# Patient Record
Sex: MALE | Race: WHITE | NOT HISPANIC OR LATINO | Employment: FULL TIME | URBAN - METROPOLITAN AREA
[De-identification: names, ages, dates, MRNs, and addresses within clinical notes are randomized per-mention and may not be internally consistent; named-entity substitution may affect disease eponyms.]

---

## 2017-08-28 ENCOUNTER — OFFICE VISIT - RIVER FALLS (OUTPATIENT)
Dept: FAMILY MEDICINE | Facility: CLINIC | Age: 57
End: 2017-08-28

## 2017-08-28 ASSESSMENT — MIFFLIN-ST. JEOR: SCORE: 1790.81

## 2018-08-14 ENCOUNTER — OFFICE VISIT - RIVER FALLS (OUTPATIENT)
Dept: FAMILY MEDICINE | Facility: CLINIC | Age: 58
End: 2018-08-14

## 2018-08-14 ASSESSMENT — MIFFLIN-ST. JEOR: SCORE: 1781.74

## 2018-10-03 ENCOUNTER — OFFICE VISIT - RIVER FALLS (OUTPATIENT)
Dept: FAMILY MEDICINE | Facility: CLINIC | Age: 58
End: 2018-10-03

## 2018-10-03 ASSESSMENT — MIFFLIN-ST. JEOR: SCORE: 1789.91

## 2019-01-09 ENCOUNTER — OFFICE VISIT - RIVER FALLS (OUTPATIENT)
Dept: FAMILY MEDICINE | Facility: CLINIC | Age: 59
End: 2019-01-09

## 2019-08-12 ENCOUNTER — OFFICE VISIT - RIVER FALLS (OUTPATIENT)
Dept: FAMILY MEDICINE | Facility: CLINIC | Age: 59
End: 2019-08-12

## 2019-08-12 LAB
ALBUMIN UR-MCNC: NEGATIVE G/DL
BILIRUB UR QL STRIP: NEGATIVE
GLUCOSE UR STRIP-MCNC: NEGATIVE MG/DL
HGB UR QL STRIP: NEGATIVE
KETONES UR STRIP-MCNC: NEGATIVE MG/DL
LEUKOCYTE ESTERASE UR QL STRIP: NEGATIVE
NITRATE UR QL: NEGATIVE
PH UR STRIP: 7 [PH] (ref 5–8)
SP GR UR STRIP: 1.01 (ref 1–1.03)

## 2019-08-12 ASSESSMENT — MIFFLIN-ST. JEOR: SCORE: 1763.6

## 2019-08-19 ENCOUNTER — OFFICE VISIT - RIVER FALLS (OUTPATIENT)
Dept: FAMILY MEDICINE | Facility: CLINIC | Age: 59
End: 2019-08-19

## 2019-08-19 ENCOUNTER — COMMUNICATION - RIVER FALLS (OUTPATIENT)
Dept: FAMILY MEDICINE | Facility: CLINIC | Age: 59
End: 2019-08-19

## 2019-08-20 ENCOUNTER — COMMUNICATION - RIVER FALLS (OUTPATIENT)
Dept: FAMILY MEDICINE | Facility: CLINIC | Age: 59
End: 2019-08-20

## 2019-08-20 LAB
B BURGDOR IGG+IGM SER QL: <0.9
ERYTHROCYTE [DISTWIDTH] IN BLOOD BY AUTOMATED COUNT: 12.7 % (ref 11–15)
HCT VFR BLD AUTO: 44.3 % (ref 38.5–50)
HGB BLD-MCNC: 14.8 GM/DL (ref 13.2–17.1)
MCH RBC QN AUTO: 30.1 PG (ref 27–33)
MCHC RBC AUTO-ENTMCNC: 33.4 GM/DL (ref 32–36)
MCV RBC AUTO: 90.2 FL (ref 80–100)
PLATELET # BLD AUTO: 163 10*3/UL (ref 140–400)
PMV BLD: 9.7 FL (ref 7.5–12.5)
RBC # BLD AUTO: 4.91 10*6/UL (ref 4.2–5.8)
TSH SERPL DL<=0.005 MIU/L-ACNC: 2.77 MIU/L (ref 0.4–4.5)
WBC # BLD AUTO: 4.4 10*3/UL (ref 3.8–10.8)

## 2019-08-22 ENCOUNTER — COMMUNICATION - RIVER FALLS (OUTPATIENT)
Dept: FAMILY MEDICINE | Facility: CLINIC | Age: 59
End: 2019-08-22

## 2020-08-05 ENCOUNTER — OFFICE VISIT - RIVER FALLS (OUTPATIENT)
Dept: FAMILY MEDICINE | Facility: CLINIC | Age: 60
End: 2020-08-05

## 2020-08-05 ASSESSMENT — MIFFLIN-ST. JEOR: SCORE: 1745.45

## 2020-08-06 LAB
ALBUMIN UR-MCNC: NEGATIVE G/DL
BILIRUB UR QL STRIP: NEGATIVE
GLUCOSE UR STRIP-MCNC: NEGATIVE MG/DL
HGB UR QL STRIP: NEGATIVE
KETONES UR STRIP-MCNC: NEGATIVE MG/DL
LEUKOCYTE ESTERASE UR QL STRIP: NEGATIVE
NITRATE UR QL: NEGATIVE
PH UR STRIP: 6.5 [PH] (ref 5–8)
SP GR UR STRIP: NORMAL (ref 1–1.03)

## 2021-08-02 ENCOUNTER — OFFICE VISIT - RIVER FALLS (OUTPATIENT)
Dept: FAMILY MEDICINE | Facility: CLINIC | Age: 61
End: 2021-08-02

## 2021-08-02 ASSESSMENT — MIFFLIN-ST. JEOR: SCORE: 1772.67

## 2021-08-06 LAB
ALBUMIN UR-MCNC: NEGATIVE G/DL
APPEARANCE UR: CLEAR
BILIRUB UR QL STRIP: NEGATIVE
COLOR UR AUTO: YELLOW
GLUCOSE UR STRIP-MCNC: NEGATIVE MG/DL
HGB UR QL STRIP: NEGATIVE
KETONES UR STRIP-MCNC: NEGATIVE MG/DL
LEUKOCYTE ESTERASE UR QL STRIP: NEGATIVE
NITRATE UR QL: NEGATIVE
PH UR STRIP: 5 [PH]
SP GR UR STRIP: 1.03
UROBILINOGEN UR STRIP-MCNC: NORMAL MG/DL

## 2022-02-12 VITALS
HEART RATE: 52 BPM | SYSTOLIC BLOOD PRESSURE: 114 MMHG | TEMPERATURE: 97.4 F | SYSTOLIC BLOOD PRESSURE: 112 MMHG | DIASTOLIC BLOOD PRESSURE: 68 MMHG | BODY MASS INDEX: 26.37 KG/M2 | WEIGHT: 199 LBS | DIASTOLIC BLOOD PRESSURE: 62 MMHG | HEIGHT: 73 IN | WEIGHT: 199 LBS | HEART RATE: 64 BPM | BODY MASS INDEX: 26.62 KG/M2 | RESPIRATION RATE: 16 BRPM | TEMPERATURE: 97.6 F

## 2022-02-12 VITALS
SYSTOLIC BLOOD PRESSURE: 113 MMHG | TEMPERATURE: 97.4 F | WEIGHT: 206.8 LBS | DIASTOLIC BLOOD PRESSURE: 73 MMHG | BODY MASS INDEX: 27.66 KG/M2 | HEART RATE: 74 BPM

## 2022-02-12 VITALS
HEIGHT: 73 IN | HEART RATE: 60 BPM | BODY MASS INDEX: 26.64 KG/M2 | SYSTOLIC BLOOD PRESSURE: 120 MMHG | RESPIRATION RATE: 16 BRPM | TEMPERATURE: 97.7 F | DIASTOLIC BLOOD PRESSURE: 70 MMHG | WEIGHT: 201 LBS

## 2022-02-12 VITALS
HEIGHT: 73 IN | WEIGHT: 204.8 LBS | DIASTOLIC BLOOD PRESSURE: 68 MMHG | OXYGEN SATURATION: 97 % | HEART RATE: 58 BPM | SYSTOLIC BLOOD PRESSURE: 106 MMHG | BODY MASS INDEX: 27.14 KG/M2

## 2022-02-12 VITALS
TEMPERATURE: 96.8 F | BODY MASS INDEX: 27.17 KG/M2 | RESPIRATION RATE: 16 BRPM | HEART RATE: 60 BPM | SYSTOLIC BLOOD PRESSURE: 116 MMHG | WEIGHT: 205 LBS | DIASTOLIC BLOOD PRESSURE: 72 MMHG | HEIGHT: 73 IN

## 2022-02-12 VITALS
RESPIRATION RATE: 16 BRPM | HEART RATE: 52 BPM | HEIGHT: 73 IN | TEMPERATURE: 97.1 F | BODY MASS INDEX: 26.9 KG/M2 | WEIGHT: 203 LBS

## 2022-02-12 VITALS
SYSTOLIC BLOOD PRESSURE: 108 MMHG | HEIGHT: 73 IN | DIASTOLIC BLOOD PRESSURE: 76 MMHG | HEART RATE: 52 BPM | BODY MASS INDEX: 25.84 KG/M2 | WEIGHT: 195 LBS | RESPIRATION RATE: 16 BRPM | TEMPERATURE: 97.8 F

## 2022-02-16 NOTE — PROGRESS NOTES
Patient:   OLLIE ANDREW            MRN: [id number]            FIN: [id number]               Age:   61 years     Sex:  Male     :  1960   Associated Diagnoses:   Encounter for examination required by Department of Transportation (DOT)   Author:   Blayne Esparza PA-C      Chief Complaint   2021 10:26 AM CDT    Pt here for a DOT Px.        Subjective   Chief complaint 2021 10:26 AM CDT    Pt here for a DOT Px.   DOT exam.     Here for DOT exam  he drives for [employer]  he has sleep apnea and is on CPAP and uses it regularly      Health Status   Allergies:    Allergic Reactions (Selected)  No Known Medication Allergies   Medications:  (Selected)   Prescriptions  Prescribed  CPAP 6 cm water pressure: CPAP 6 cm water pressure, See Instructions, Instructions: Use every night., Supply, # 1 EA, 0 Refill(s), Type: Maintenance, not on any regular medications   Problem list:    All Problems  Moderate obstructive sleep apnea / SNOMED CT 257496891 / Confirmed  Canceled: CT Cardiac Calcium Score      Objective         Vital Signs   2021 10:26 AM CDT Temperature Tympanic 97.7 DegF  LOW    Peripheral Pulse Rate 60 bpm    Pulse Site Radial artery    Respiratory Rate 16 br/min    Systolic Blood Pressure 120 mmHg    Diastolic Blood Pressure 70 mmHg    Mean Arterial Pressure 87 mmHg    BP Site Right arm      Measurements from flowsheet : Measurements   2021 10:26 AM CDT Height Measured - Standard 72.5 in    Weight Measured - Standard 201 lb    BSA 2.16 m2    Body Mass Index 26.88 kg/m2  HI      General:  No acute distress.    Eye:  Pupils are equal, round and reactive to light, Extraocular movements are intact, visual acuity is normal, horizontal field of vision is 90 degrees bilaterally.    HENT:  Tympanic membranes are clear, Normal hearing, No pharyngeal erythema, No sinus tenderness.    Neck:  Supple, Non-tender, No lymphadenopathy.    Respiratory:  Lungs are clear to auscultation.    Cardiovascular:   Normal rate, Regular rhythm, No murmur.    Gastrointestinal:  Soft, Non-tender, Non-distended, Normal bowel sounds, No organomegaly.    Genitourinary:  testicles smooth symmetrical, without nodules, no rashes or lesions, no hernia present.    Musculoskeletal:  Normal range of motion.    Neurologic:  Cranial Nerves II-XII are grossly intact, Normal deep tendon reflexes, Romberg is negative.    Psychiatric:  Appropriate mood & affect.       Results Review   Results review   Urinalysis is normal      Impression and Plan   Assessment and Plan:          Diagnosis: Encounter for examination required by Department of Transportation (DOT) ([id number] Z02.89).    Orders     Orders   Charges:  9939D DOT Exam (Charge) (Order): Quantity: 1, Encounter for examination required by Department of Transportation (DOT).     Normal DOT exam; Approved for 1 year due to sleep apnea.     .

## 2022-02-16 NOTE — NURSING NOTE
Comprehensive Intake Entered On:  8/5/2020 12:27 PM CDT    Performed On:  8/5/2020 12:14 PM CDT by Bernardo Lindsay CMA               Summary   Chief Complaint :   Pt here for a DOT Px.   Weight Measured :   195 lb(Converted to: 195 lb 0 oz, 88.45 kg)    Height Measured :   72.5 in(Converted to: 6 ft 0 in, 184.15 cm)    Body Mass Index :   26.08 kg/m2 (HI)    Body Surface Area :   2.12 m2   Systolic Blood Pressure :   108 mmHg   Diastolic Blood Pressure :   76 mmHg   Mean Arterial Pressure :   87 mmHg   Peripheral Pulse Rate :   52 bpm (LOW)    BP Site :   Right arm   Pulse Site :   Radial artery   Temperature Tympanic :   97.8 DegF(Converted to: 36.6 DegC)  (LOW)    Respiratory Rate :   16 br/min   Bernardo Lindsay CMA - 8/5/2020 12:14 PM CDT   Health Status   Allergies Verified? :   Yes   Medication History Verified? :   Yes   Medical History Verified? :   Yes   Pre-Visit Planning Status :   Not completed   Tobacco Use? :   Never smoker   Bernardo Lindsay CMA - 8/5/2020 12:14 PM CDT   Meds / Allergies   (As Of: 8/5/2020 12:27:06 PM CDT)   Allergies (Active)   No Known Medication Allergies  Estimated Onset Date:   Unspecified ; Created By:   Nery Donaldson; Reaction Status:   Active ; Category:   Drug ; Substance:   No Known Medication Allergies ; Type:   Allergy ; Updated By:   Nery Donaldson; Reviewed Date:   8/5/2020 12:22 PM CDT        Medication List   (As Of: 8/5/2020 12:27:06 PM CDT)   Prescription/Discharge Order    doxycycline  :   doxycycline ; Status:   Processing ; Ordered As Mnemonic:   doxycycline hyclate 100 mg oral tablet ; Ordering Provider:   Anupam Licona MD; Action Display:   Complete ; Catalog Code:   doxycycline ; Order Dt/Tm:   8/5/2020 12:22:02 PM CDT          Miscellaneous Rx Supply  :   Miscellaneous Rx Supply ; Status:   Prescribed ; Ordered As Mnemonic:   CPAP 6 cm water pressure ; Simple Display Line:   See Instructions, Use every night., 1 EA ; Ordering Provider:   Rod STERN,  Errol; Catalog Code:   Miscellaneous Rx Supply ; Order Dt/Tm:   2/2/2015 6:20:36 PM CST            Vision Testing POC   Corrective Lenses :   None   Eye, Left Visual Acuity :   20/20   Eye, Right Visual Acuity :   20/20   Eye, Bilateral Visual Acuity :   20/15   Bernardo Lindsay CMA - 8/5/2020 12:14 PM CDT   ID Risk Screen   Recent Travel History :   No recent travel   Family Member Travel History :   No recent travel   Other Exposure to Infectious Disease :   Unknown   Bernardo Lindsay CMA - 8/5/2020 12:14 PM CDT   Social History   Social History   (As Of: 8/5/2020 12:27:06 PM CDT)   Alcohol:        Current   Comments:  2/2/2015 6:10 PM - Errol Velasco MD: Rarely   (Last Updated: 2/2/2015 6:10:38 PM CST by Errol Velasco MD)          Tobacco:        Never   (Last Updated: 2/2/2015 6:10:38 PM CST by Errol Velasco MD)          Employment/School:        Employed   Comments:  2/2/2015 6:10 PM - Errol Velasco MD:    (Last Updated: 2/2/2015 6:10:38 PM CST by Errol Velasco MD)          Home/Environment:        Marital status:  (Living together).   (Last Updated: 2/2/2015 6:10:38 PM CST by Errol Velasco MD)          Exercise:        Exercise frequency: 3-4 times/week.   (Last Updated: 2/2/2015 6:10:38 PM CST by Errol Velasco MD)

## 2022-02-16 NOTE — NURSING NOTE
Comprehensive Intake Entered On:  1/9/2019 11:19 AM CST    Performed On:  1/9/2019 11:16 AM CST by Nery Donaldson               Summary   Chief Complaint :   Preop. [facility].  1/23/19.  Dr. Scarlett Martin   Weight Measured :   206.8 lb(Converted to: 206 lb 13 oz, 93.80 kg)    Systolic Blood Pressure :   113 mmHg   Diastolic Blood Pressure :   73 mmHg   Mean Arterial Pressure :   86 mmHg   Peripheral Pulse Rate :   74 bpm   BP Method :   Electronic   HR Method :   Electronic   Temperature Tympanic :   97.4 DegF(Converted to: 36.3 DegC)  (LOW)    Nery Donaldson - 1/9/2019 11:16 AM CST   Health Status   Allergies Verified? :   Yes   Medication History Verified? :   Yes   Pre-Visit Planning Status :   Completed   Tobacco Use? :   Never smoker   Nery Donaldson - 1/9/2019 11:16 AM CST   Meds / Allergies   (As Of: 1/9/2019 11:19:24 AM CST)   Allergies (Active)   No Known Medication Allergies  Estimated Onset Date:   Unspecified ; Created By:   Nery Donaldson; Reaction Status:   Active ; Category:   Drug ; Substance:   No Known Medication Allergies ; Type:   Allergy ; Updated By:   Nery Donaldson; Reviewed Date:   1/9/2019 11:18 AM CST        Medication List   (As Of: 1/9/2019 11:19:24 AM CST)   Prescription/Discharge Order    clindamycin topical  :   clindamycin topical ; Status:   Processing ; Ordered As Mnemonic:   clindamycin 1% topical lotion ; Ordering Provider:   Blayne Esparza PA-C; Action Display:   Complete ; Catalog Code:   clindamycin topical ; Order Dt/Tm:   1/9/2019 11:17:56 AM          Miscellaneous Rx Supply  :   Miscellaneous Rx Supply ; Status:   Prescribed ; Ordered As Mnemonic:   CPAP 6 cm water pressure ; Simple Display Line:   See Instructions, Use every night., 1 EA ; Ordering Provider:   Errol Velasco MD; Catalog Code:   Miscellaneous Rx Supply ; Order Dt/Tm:   2/2/2015 6:20:36 PM

## 2022-02-16 NOTE — PROGRESS NOTES
Patient:   OLLIE ANDREW            MRN: [id number]            FIN: [id number]               Age:   60 years     Sex:  Male     :  1960   Associated Diagnoses:   Encounter for examination required by Department of Transportation (DOT)   Author:   Blayne Esparza PA-C      Chief Complaint   2020 12:14 PM CDT    Pt here for a DOT Px.      Subjective   Chief complaint DOT exam.     Here for DOT exam  he drives for [employer]  he has sleep apnea, wears CPAP has good compliance (100% use > 4hrs; average daily usage is 5 hours, 1 minute)      Health Status   Allergies:    Allergic Reactions (Selected)  No Known Medication Allergies   Medications:  (Selected)   Prescriptions  Prescribed  CPAP 6 cm water pressure: CPAP 6 cm water pressure, See Instructions, Instructions: Use every night., Supply, # 1 EA, 0 Refill(s), Type: Maintenance  doxycycline hyclate 100 mg oral tablet: = 1 tab(s) ( 100 mg ), PO, bid, Instructions: fill if not improving over the next 14 days, # 20 tab(s), 0 Refill(s), Type: Maintenance, [place] [facility] [place](s) Oral bid,x10 day(s),Instr:fill if not improving over the next 14..., not on any regular medications   Problem list:    All Problems  Moderate obstructive sleep apnea / SNOMED CT [id number] / Confirmed  Canceled: CT Cardiac Calcium Score      Objective         Vital Signs   2020 12:14 PM CDT Temperature Tympanic 97.8 DegF  LOW    Peripheral Pulse Rate 52 bpm  LOW    Pulse Site Radial artery    Respiratory Rate 16 br/min    Systolic Blood Pressure 108 mmHg    Diastolic Blood Pressure 76 mmHg    Mean Arterial Pressure 87 mmHg    BP Site Right arm      Measurements from flowsheet : Measurements   2020 12:14 PM CDT Height Measured 72.5 in    Weight Measured 195 lb    BSA 2.12 m2    Body Mass Index 26.08 kg/m2  HI      General:  No acute distress.    Eye:  Pupils are equal, round and reactive to light, Extraocular movements are intact, visual acuity is  normal, horizontal field of vision is 90 degrees bilaterally.    HENT:  Tympanic membranes are clear, Normal hearing, No pharyngeal erythema, No sinus tenderness.    Neck:  Supple, Non-tender, No lymphadenopathy.    Respiratory:  Lungs are clear to auscultation.    Cardiovascular:  Normal rate, Regular rhythm, No murmur.    Gastrointestinal:  Soft, Non-tender, Non-distended, Normal bowel sounds, No organomegaly.    Genitourinary:  testicles smooth symmetrical, without nodules, no rashes or lesions, no hernia present.    Musculoskeletal:  Normal range of motion.    Neurologic:  Cranial Nerves II-XII are grossly intact, Normal deep tendon reflexes, Romberg is negative.    Psychiatric:  Appropriate mood & affect.       Results Review   Results review   Urinalysis is normal      Impression and Plan   Assessment and Plan:          Diagnosis: Encounter for examination required by Department of Transportation (DOT) (PPS62-ID Z02.89).    Orders     Orders   Charges:  9939D DOT Exam (Charge) (Order): Quantity: 1, Encounter for examination required by Department of Transportation (DOT).     Normal DOT exam; Approved for 1 year due to sleep apnea.     .

## 2022-02-16 NOTE — PROGRESS NOTES
Patient:   OLLIE ANDREW            MRN: [id number]            FIN: [id number]               Age:   58 years     Sex:  Male     :  1960   Associated Diagnoses:   Complete rotator cuff tear   Author:   Nigel Villa MD      Preoperative Information   Indication for surgery:  Rotator cuff tear.    Accompanied by:  No one.    Source of history:  Self.           Chief Complaint   2019 11:16 AM CST    Preop. [facility].  19.  Dr. Scarlett Martin        Review of Systems   Constitutional:  Negative.    Eye:  Negative.    Ear/Nose/Mouth/Throat:  Negative.    Respiratory:  Negative.    Cardiovascular:  Negative.    Gastrointestinal:  Negative.    Genitourinary:  Negative.    Hematology/Lymphatics:  Negative.    Endocrine:  Negative.    Immunologic:  Negative.    Musculoskeletal:  Negative.    Integumentary:  Negative.    Neurologic:  Negative.       Health Status   Allergies:    Allergic Reactions (Selected)  No Known Medication Allergies   Medications:  (Selected)   Prescriptions  Prescribed  CPAP 6 cm water pressure: CPAP 6 cm water pressure, See Instructions, Instructions: Use every night., Supply, # 1 EA, 0 Refill(s), Type: Maintenance,    Medications          *denotes recorded medication          CPAP 6 cm water pressure: See Instructions, Use every night., 1 EA.     Problem list:    All Problems  Moderate obstructive sleep apnea / SNOMED CT 653019920 / Confirmed  Canceled: CT Cardiac Calcium Score      Histories   Past Medical History:    No active or resolved past medical history items have been selected or recorded.   Family History:    CAD - Coronary artery disease  Brother (Zeke, )     Procedure history:    Cardiac computed tomography for calcium scoring (SNOMED CT 9385646411) on 2014 at 54 Years.  Comments:  2014 9:48 AM CST - Nandini Lezama  Total Agatston calcium score is 50.   Social History:        Alcohol Assessment            Current                      Comments:                      02/02/2015 - Errol Velasco MD                     Rarely      Tobacco Assessment            Never      Employment and Education Assessment            Employed                     Comments:                      02/02/2015 - Errol Velasco MD                           Home and Environment Assessment            Marital status:  (Living together).      Exercise and Physical Activity Assessment            Exercise frequency: 3-4 times/week.     Has no history of anemia.  Has no history of DVT or pulmonary embolism.  Has no personal history of bleeding problems.   Has  no personal or family history of anesthesia reactions.  Patient  does not have active tuberculosis.    S/he  has not taken aspirin or aspirin containing products in the last week.     S/he  has not taken Plavix (Clopidogrel) in the last 2 weeks.    S/he has not taken warfarin in the past week.    S/he has not been on corticosteroids for more than 2 weeks recently.      S/he  is not DNR before, during or after surgery.    Chest pain / SOB walking up 2 flights of steps? no  Pain in neck or jaw?no  CAD MI?  no  Afib? no  Heart Failure?no  Asthma  or Bronchitis? no  Diabetes? no       Insulin/Orals?   Seizure Disorder? no  CKD?no  Thyroid Disease?no  Liver Diseaseno  CVA?no         Physical Examination   Vital Signs   1/9/2019 11:16 AM CST Temperature Tympanic 97.4 DegF  LOW    Peripheral Pulse Rate 74 bpm    HR Method Electronic    Systolic Blood Pressure 113 mmHg    Diastolic Blood Pressure 73 mmHg    Mean Arterial Pressure 86 mmHg    BP Method Electronic      Measurements from flowsheet : Measurements   1/9/2019 11:16 AM CST    Weight Measured - Standard                206.8 lb     General:  Alert and oriented, No acute distress.    Eye:  Pupils are equal, round and reactive to light, Extraocular movements are intact.    HENT:  Normocephalic, Tympanic membranes are clear, Normal hearing, Oral mucosa is  moist.    Neck:  Supple, Non-tender, No carotid bruit, No jugular venous distention, No lymphadenopathy, No thyromegaly.    Respiratory:  Lungs are clear to auscultation, Respirations are non-labored, Breath sounds are equal.    Cardiovascular:  Normal rate, Regular rhythm, No murmur, Good pulses equal in all extremities, No edema.    Gastrointestinal:  Soft, Non-tender, Non-distended, Normal bowel sounds, No organomegaly.    Musculoskeletal:  Normal range of motion, No tenderness, No swelling, No deformity.    Integumentary:  Warm, Dry.    Neurologic:  Alert, Oriented, Normal sensory, Normal motor function, No focal deficits, Cranial Nerves II-XII are grossly intact, Normal deep tendon reflexes.    Psychiatric:  Cooperative, Appropriate mood & affect, Normal judgment.       Health Maintenance      Recommendations     Pending (in the next year)        OverDue           Lipid Disorders Screen (Male) due  12/17/15  and every 1  year(s)        Due            Alcohol Misuse Screen (Male) due  01/09/19  and every 1  year(s)           Depression Screen (Male) due  01/09/19  and every 1  year(s)           Hepatitis C Screen 1577-2743 (Male) due  01/09/19  One-time only           Influenza Vaccine due  01/09/19  and every 1  year(s)           Lung Cancer Screen (Male) due  01/09/19  and every 1  year(s)           Tetanus Vaccine due  01/09/19  and every 10  year(s)        Near Due            Colorectal Cancer Screen (Colonoscopy) (Male) near due  02/09/19  and every 10  year(s)           Colorectal Cancer Screen (Occult Blood) (Male) near due  02/09/19  and every 10  year(s)           Colorectal Cancer Screen (Sigmoidoscopy) (Male) near due  02/09/19  and every 10  year(s)        Due In Future            Body Mass Index Check (Male) not due until  10/03/19  and every 1  year(s)           Aspirin Therapy for Prevention of CVD (Male) not due until  12/17/19  and every 5  year(s)     Satisfied (in the past 1 year)         Satisfied            Body Mass Index Check (Male) on  10/03/18.           Body Mass Index Check (Male) on  08/14/18.           High Blood Pressure Screen (Male) on  01/09/19.           High Blood Pressure Screen (Male) on  10/03/18.           Tobacco Use Screen (Male) on  01/09/19.           Tobacco Use Screen (Male) on  10/03/18.           Tobacco Use Screen (Male) on  08/14/18.        Review / Management         ECG interpretation:  Within normal limits.       Impression and Plan   Diagnosis     Complete rotator cuff tear (URH51-RZ M75.120).     Condition:  ok for surgery asa2.

## 2022-02-16 NOTE — PROGRESS NOTES
Patient:   OLLIE ANDREW            MRN: [id number]            FIN: [id number]               Age:   59 years     Sex:  Male     :  1960   Associated Diagnoses:   Fatigue   Author:   Anupam Licona MD      Visit Information      Date of Service: 2019 09:16 am  Performing Location: [facility]  Encounter#: [id number]      Primary Care Provider (PCP):  Nigel Villa MD    NPI# [id number]      Referring Provider:  Anupam Licona MD    NPI# [id number]      Chief Complaint   2019 9:22 AM CDT    c/o tick bite on left ankle x 3 1/2 weeks ago, weakness        History of Present Illness   Tick bite on left ankle 3-4 weeks ago. Feels more fatigued last few weeks. Feels run down. Left neck gland felt sore.    Returned to work 2 weeks ago after being off for shoulder surgery.      Review of Systems   Constitutional:  No fever.    Respiratory:  No shortness of breath.    Cardiovascular:  No chest pain.    Gastrointestinal:  No vomiting.       Health Status   Allergies:    Allergic Reactions (Selected)  No Known Medication Allergies   Medications:  (Selected)   Prescriptions  Prescribed  CPAP 6 cm water pressure: CPAP 6 cm water pressure, See Instructions, Instructions: Use every night., Supply, # 1 EA, 0 Refill(s), Type: Maintenance   Problem list:    All Problems  Moderate obstructive sleep apnea / SNOMED CT 314902212 / Confirmed  Canceled: CT Cardiac Calcium Score      Histories   Past Medical History:    No active or resolved past medical history items have been selected or recorded.   Family History:    CAD - Coronary artery disease  Brother (Zeke, )     Procedure history:    Cardiac computed tomography for calcium scoring (SNOMED CT 0410197096) on 2014 at 54 Years.  Comments:  2014 9:48 AM STACI - Nandini Lezama  Total Agatston calcium score is 50.     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    Neck:  Supple.    Respiratory:  Lungs are clear to auscultation.    Cardiovascular:  Normal rate, Regular rhythm.    Gastrointestinal:  Soft, Non-tender, Non-distended.    Musculoskeletal:  Normal range of motion, Normal strength.    Neurologic:  No focal deficits.       Impression and Plan   Diagnosis     Fatigue (SLV27-PN R53.83).       Possible Lyme Disease: will check lab (could be falsely negative so still need to consider Lyme). If negative then follow up if symptoms not resolving. Consider treating if symptoms do not improve

## 2022-02-16 NOTE — PROGRESS NOTES
Patient:   OLLIE ANDREW            MRN: [id number]            FIN: [id number]               Age:   58 years     Sex:  Male     :  1960   Associated Diagnoses:   None   Author:   Allen STERN, Nigel      Procedure   EKG procedure   Indication: preop.     Position: supine.     EKG findings   Interpretation: by primary care provider.     Rhythm: sinus.     Axis: normal axis, normal configuration.     Within normal limits.     Intervals: MS normal, QRS normal, QT normal.     Normal EKG.     P waves: normal.     QRS complex: normal, no Q waves present.     ST-T-U complex: normal.     Interpretation: normal EKG, no ST-T wave abnormalities.     Discussed: with patient.        Impression and Plan   Orders

## 2022-02-16 NOTE — NURSING NOTE
Comprehensive Intake Entered On:  8/19/2019 9:27 AM CDT    Performed On:  8/19/2019 9:22 AM CDT by Jennifer Lopez               Summary   Chief Complaint :   c/o tick bite on left ankle x 3 1/2 weeks ago, weakness     Weight Measured :   199.0 lb(Converted to: 199 lb 0 oz, 90.26 kg)    Systolic Blood Pressure :   112 mmHg   Diastolic Blood Pressure :   62 mmHg   Mean Arterial Pressure :   79 mmHg   Peripheral Pulse Rate :   64 bpm   BP Site :   Right arm   Pulse Site :   Radial artery   BP Method :   Manual   HR Method :   Manual   Temperature Tympanic :   97.6 DegF(Converted to: 36.4 DegC)  (LOW)    Jennifer Lopez - 8/19/2019 9:22 AM CDT   Health Status   Allergies Verified? :   Yes   Medication History Verified? :   Yes   Medical History Verified? :   Yes   Pre-Visit Planning Status :   Completed   Tobacco Use? :   Never smoker   Jennifer Lopez - 8/19/2019 9:22 AM CDT   Consents   Consent for Immunization Exchange :   Consent Granted   Consent for Immunizations to Providers :   Consent Granted   Jennifer Lopez - 8/19/2019 9:22 AM CDT   Meds / Allergies   (As Of: 8/19/2019 9:27:28 AM CDT)   Allergies (Active)   No Known Medication Allergies  Estimated Onset Date:   Unspecified ; Created By:   Nery Donaldson; Reaction Status:   Active ; Category:   Drug ; Substance:   No Known Medication Allergies ; Type:   Allergy ; Updated By:   Nery Donaldson; Reviewed Date:   8/19/2019 9:24 AM CDT        Medication List   (As Of: 8/19/2019 9:27:28 AM CDT)   Prescription/Discharge Order    Miscellaneous Rx Supply  :   Miscellaneous Rx Supply ; Status:   Prescribed ; Ordered As Mnemonic:   CPAP 6 cm water pressure ; Simple Display Line:   See Instructions, Use every night., 1 EA ; Ordering Provider:   Errol Velasco MD; Catalog Code:   Miscellaneous Rx Supply ; Order Dt/Tm:   2/2/2015 6:20:36 PM

## 2022-02-16 NOTE — PROGRESS NOTES
Patient:   OLLIE ANDREW            MRN: [id number]            FIN: [id number]               Age:   58 years     Sex:  Male     :  1960   Associated Diagnoses:   Partial tear of right rotator cuff   Author:   Nigel Villa MD      Visit Information      Date of Service: 10/03/2018 10:34 am  Performing Location: [facility]  Encounter#: [id number]      Chief Complaint   10/3/2018 10:34 AM CDT   Here for concern with right roattor cuff injury playing football, says he has injured same shoulder in the past and went to ortho, no previous surgery on shoulder        History of Present Illness   chief complaint and symptoms as noted above confirmed with patient   Happened a week ago  getting better      Review of Systems   Musculoskeletal:  Negative except as documented in history of present illness.    Integumentary:  Negative.    Neurologic:  Negative.       Health Status   Allergies:    Allergic Reactions (Selected)  Severity Not Documented  No known allergies (No reactions were documented)   Medications:  (Selected)   Prescriptions  Prescribed  CPAP 6 cm water pressure: CPAP 6 cm water pressure, See Instructions, Instructions: Use every night., Supply, # 1 EA, 0 Refill(s), Type: Maintenance  clindamycin 1% topical lotion: 1 kane, TOP, hs, # 60 mL, 1 Refill(s), Type: Maintenance, [place] [facility] [place] Topical hs,    Medications          *denotes recorded medication          CPAP 6 cm water pressure: See Instructions, Use every night., 1 EA.          clindamycin 1% topical lotion: 1 kane, TOP, hs, 60 mL, 1 Refill(s).        Physical Examination   Vital Signs   10/3/2018 10:34 AM CDT Peripheral Pulse Rate 58 bpm  LOW    Pulse Site Radial artery    Systolic Blood Pressure 106 mmHg    Diastolic Blood Pressure 68 mmHg    Mean Arterial Pressure 81 mmHg    BP Site Right arm    Oxygen Saturation 97 %      Measurements from flowsheet : Measurements   10/3/2018 10:34 AM CDT Height  Measured - Standard 72.5 in    Weight Measured - Standard 204.8 lb    BSA 2.18 m2    Body Mass Index 27.39 kg/m2  HI      General:  Alert and oriented, No acute distress.    Musculoskeletal:       Upper extremity exam: Shoulder ( Right, Slight decrease rom  RTC pain with stressing Some weakness  cms otherwise intact ).    Neurologic:  Alert, Oriented.       Impression and Plan   Diagnosis     Partial tear of right rotator cuff (IOM60-VO M75.111).     Course:  Improving.    Plan:  consider mri if not better with pt.         Refer to: Physical therapy.    Patient Instructions:       Counseled: Patient, Regarding treatment, Regarding diagnosis, Activity.

## 2022-02-16 NOTE — NURSING NOTE
Urine Dipstick POC Entered On:  8/6/2021 9:10 AM CDT    Performed On:  8/2/2021 9:09 AM CDT by Cynthia Mayer               Urine Dipstick POC   Urine Color Urine Dipstick :   Yellow   Urine Appearance Urine Dipstick :   Clear   Glucose Urine Dipstick :   Negative   Bilirubin Urine Dipstick :   Negative   Ketones Urine Dipstick :   Negative   Specific Gravity Urine Dipstick :   1.030   Blood Urine Dipstick :   Negative   pH Urine Dipstick :   5   Protein Urine Dipstick :   Negative   Urobilinogen Urine Dipstick :   0.2 mg/dl   Nitrite Urine Dipstick :   Negative   Leukocytes Urine Dipstick :   Negative   POC Test Comments :   Performed at [facility]   Cynthia Mayer - 8/6/2021 9:09 AM CDT

## 2022-02-16 NOTE — PROGRESS NOTES
Patient:   OLLIE ANDREW            MRN: [id number]            FIN: [id number]               Age:   57 years     Sex:  Male     :  1960   Associated Diagnoses:   Encounter for examination required by Department of Transportation (DOT)   Author:   Isaias AGUILAR, Blayne ZAVALA      Subjective   Chief complaint 2017 10:17 AM CDT   Pt here for DOT Px DOT exam.     Here for DOT exam  he drives for [employer]  he has sleep apnea and wears CPAP, his has good compliance (100% days usage with 93% of the days used for >4 hours)          Health Status   Allergies:    Allergic Reactions (Selected)  Severity Not Documented  No known allergies (No reactions were documented)   Medications:  (Selected)   Prescriptions  Prescribed  CPAP 6 cm water pressure: CPAP 6 cm water pressure, See Instructions, Instructions: Use every night., Supply, # 1 EA, 0 Refill(s), Type: Maintenance   Problem list:    All Problems  Moderate obstructive sleep apnea / SNOMED CT 160053635 / Confirmed  Canceled: CT Cardiac Calcium Score      Objective         Vital Signs   2017 10:17 AM CDT Temperature Tympanic 96.8 DegF  LOW    Peripheral Pulse Rate 60 bpm    Pulse Site Radial artery    Respiratory Rate 16 br/min    Systolic Blood Pressure 116 mmHg    Diastolic Blood Pressure 72 mmHg    Mean Arterial Pressure 87 mmHg    BP Site Right arm      Measurements from flowsheet : Measurements   2017 10:17 AM CDT Height Measured - Standard 72.5 in    Weight Measured - Standard 205 lb    BSA 2.18 m2    Body Mass Index 27.42 kg/m2      General:  No acute distress.    Eye:  Pupils are equal, round and reactive to light, Extraocular movements are intact, visual acuity is normal, horizontal field of vision is 90 degrees bilaterally.    HENT:  Tympanic membranes are clear, Normal hearing, No pharyngeal erythema, No sinus tenderness.    Neck:  Supple, Non-tender, No lymphadenopathy.    Respiratory:  Lungs are clear to auscultation.    Cardiovascular:   Normal rate, Regular rhythm, No murmur.    Gastrointestinal:  Soft, Non-tender, Non-distended, Normal bowel sounds, No organomegaly.    Genitourinary:  testicles smooth symmetrical, without nodules, no rashes or lesions, no hernia present.    Musculoskeletal:  Normal range of motion.    Neurologic:  Cranial Nerves II-XII are grossly intact, Normal deep tendon reflexes, Romberg is negative.    Psychiatric:  Appropriate mood & affect.       Impression and Plan   Assessment and Plan:          Diagnosis: Encounter for examination required by Department of Transportation (DOT) (SRB13-UM Z02.89).    Orders     Orders   Charges:  9939D DOT Exam (Charge) (Order): Quantity: 1, Encounter for examination required by Department of Transportation (DOT).     Normal DOT exam; Approved for 1 year due to sleep apnea.     .

## 2022-02-16 NOTE — NURSING NOTE
Comprehensive Intake Entered On:  8/2/2021 10:33 AM CDT    Performed On:  8/2/2021 10:26 AM CDT by Bernardo Lindsay CMA               Summary   Chief Complaint :   Pt here for a DOT Px.   Weight Measured :   201 lb(Converted to: 201 lb 0 oz, 91.172 kg)    Height Measured :   72.5 in(Converted to: 6 ft 0 in, 184.15 cm)    Body Mass Index :   26.88 kg/m2 (HI)    Body Surface Area :   2.16 m2   Systolic Blood Pressure :   120 mmHg   Diastolic Blood Pressure :   70 mmHg   Mean Arterial Pressure :   87 mmHg   Peripheral Pulse Rate :   60 bpm   BP Site :   Right arm   Pulse Site :   Radial artery   Temperature Tympanic :   97.7 DegF(Converted to: 36.5 DegC)  (LOW)    Respiratory Rate :   16 br/min   Bernardo Lindsay CMA - 8/2/2021 10:26 AM CDT   Health Status   Allergies Verified? :   Yes   Medication History Verified? :   Yes   Medical History Verified? :   Yes   Pre-Visit Planning Status :   Completed   Tobacco Use? :   Never smoker   Bernardo Lindsay CMA - 8/2/2021 10:26 AM CDT   Meds / Allergies   (As Of: 8/2/2021 10:33:52 AM CDT)   Allergies (Active)   No Known Medication Allergies  Estimated Onset Date:   Unspecified ; Created By:   Nery Donaldson; Reaction Status:   Active ; Category:   Drug ; Substance:   No Known Medication Allergies ; Type:   Allergy ; Updated By:   Nery Donaldson; Reviewed Date:   8/5/2020 12:22 PM CDT        Medication List   (As Of: 8/2/2021 10:33:52 AM CDT)   Prescription/Discharge Order    Miscellaneous Rx Supply  :   Miscellaneous Rx Supply ; Status:   Prescribed ; Ordered As Mnemonic:   CPAP 6 cm water pressure ; Simple Display Line:   See Instructions, Use every night., 1 EA ; Ordering Provider:   Errol Velasco MD; Catalog Code:   Miscellaneous Rx Supply ; Order Dt/Tm:   2/2/2015 6:20:36 PM CST            Vision Testing POC   Corrective Lenses :   None   Eye, Left Visual Acuity :   20/15   Eye, Right Visual Acuity :   20/25   Eye, Bilateral Visual Acuity :   20/15   Bernardo Lindsay CMA  - 8/2/2021 10:26 AM CDT   Social History   Social History   (As Of: 8/2/2021 10:33:52 AM CDT)   Alcohol:        Current   Comments:  2/2/2015 6:10 PM - Errol Velasco MD: Rarely   (Last Updated: 2/2/2015 6:10:38 PM CST by Errol Velasco MD)          Tobacco:        Never   (Last Updated: 2/2/2015 6:10:38 PM CST by Errol Velasco MD)   Never (less than 100 in lifetime)   (Last Updated: 8/2/2021 10:27:14 AM CDT by Bernardo Lindsay CMA)          Electronic Cigarette/Vaping:        Electronic Cigarette Use: Never.   (Last Updated: 8/2/2021 10:27:18 AM CDT by Bernardo Lindsay CMA)          Employment/School:        Employed   Comments:  2/2/2015 6:10 PM - Errol Velasco MD:    (Last Updated: 2/2/2015 6:10:38 PM CST by Errol Velasco MD)          Home/Environment:        Marital status:  (Living together).   (Last Updated: 2/2/2015 6:10:38 PM CST by Errol Velasco MD)          Exercise:        Exercise frequency: 3-4 times/week.   (Last Updated: 2/2/2015 6:10:38 PM CST by Errol Velasco MD)

## 2022-02-16 NOTE — PROGRESS NOTES
Patient:   OLLIE ANDREW            MRN: [id number]            FIN: [id number]               Age:   59 years     Sex:  Male     :  1960   Associated Diagnoses:   Encounter for examination required by Department of Transportation (DOT)   Author:   Isaias AGUILAR, Blayne ZAVALA      Chief Complaint   2019 11:24 AM CDT   Pt here for a DOT Px.      Subjective   Chief complaint 2019 11:24 AM CDT   Pt here for a DOT Px. DOT exam.     Here for DOT exam  he drives for [employer]  he has sleep apnea, wears CPAP has good compliance (97% use > 4hrs)          Health Status   Allergies:    Allergic Reactions (Selected)  No Known Medication Allergies   Medications:  (Selected)   Prescriptions  Prescribed  CPAP 6 cm water pressure: CPAP 6 cm water pressure, See Instructions, Instructions: Use every night., Supply, # 1 EA, 0 Refill(s), Type: Maintenance, not on any regular medications   Problem list:    All Problems  Moderate obstructive sleep apnea / SNOMED CT [id number] / Confirmed  Canceled: CT Cardiac Calcium Score      Objective         Vital Signs   2019 11:24 AM CDT Temperature Tympanic 97.4 DegF  LOW    Peripheral Pulse Rate 52 bpm  LOW    Pulse Site Radial artery    Respiratory Rate 16 br/min    Systolic Blood Pressure 114 mmHg    Diastolic Blood Pressure 68 mmHg    Mean Arterial Pressure 83 mmHg    BP Site Right arm      Measurements from flowsheet : Measurements   2019 11:24 AM CDT Height Measured - Standard 72.5 in    Weight Measured - Standard 199 lb    BSA 2.15 m2    Body Mass Index 26.62 kg/m2  HI      General:  No acute distress.    Eye:  Pupils are equal, round and reactive to light, Extraocular movements are intact, visual acuity is normal, horizontal field of vision is 90 degrees bilaterally.    HENT:  Tympanic membranes are clear, Normal hearing, No pharyngeal erythema, No sinus tenderness.    Neck:  Supple, Non-tender, No lymphadenopathy.    Respiratory:  Lungs are clear to auscultation.     Cardiovascular:  Normal rate, Regular rhythm, No murmur.    Gastrointestinal:  Soft, Non-tender, Non-distended, Normal bowel sounds, No organomegaly.    Genitourinary:  testicles smooth symmetrical, without nodules, no rashes or lesions, no hernia present.    Musculoskeletal:  Normal range of motion.    Neurologic:  Cranial Nerves II-XII are grossly intact, Normal deep tendon reflexes, Romberg is negative.    Psychiatric:  Appropriate mood & affect.       Results Review   Results review   Lab results   8/12/2019 11:17 AM CDT UA pH 7.0    UA Specific Gravity 1.015    UA Glucose NEGATIVE    UA Bilirubin NEGATIVE    UA Ketones NEGATIVE    U Occult Blood NEGATIVE    UA Protein NEGATIVE    UA Nitrite NEGATIVE    UA Leuk Est NEGATIVE      Urinalysis is normal      Impression and Plan   Assessment and Plan:          Diagnosis: Encounter for examination required by Department of Transportation (DOT) (VOV53-PN Z02.89).    Orders     Orders   Charges:  9939D DOT Exam (Charge) (Order): Quantity: 1, Encounter for examination required by Department of Transportation (DOT).     Normal DOT exam; Approved for 1 year due to sleep apnea.     .

## 2022-02-16 NOTE — TELEPHONE ENCOUNTER
---------------------  From: Zhen/Jennifer MORROW (Phone Messages Pool ([id number]_[facility]))   Sent: 8/22/2019 1:47:53 PM CDT  Subject: Lab Results      Phone Message    PCP: SERA    Time of Call: 0520    Phone Number: [phone]    Returned call at: 1305    Note:     Patient called looking for labs results that he had done on 8/19/19.    Called patient and told him that a results letter was sent out on 8/20/19 and I informed him what the letter said. Told patient he should be receiving the letter shortly.       Last office visit and reason: 8/19/19 Fatigue   Dakota Calvo)  Family Medicine  [address] [place] [address]  Phone: [phone]  Fax: [phone]  Follow Up Time: 1-3 Days   Dakota Calvo)  Family Medicine  [address] [place] [address]  Phone: [phone]  Fax: [phone]  Follow Up Time: 1-3 Days    Jesse Brown ()  Internal Medicine  [address] [place] [address]  Phone: [phone]  Fax: [phone]  Follow Up Time: 1-3 Days

## 2022-02-16 NOTE — LETTER
(Inserted Image. Unable to display)   August 20, 2019      OLLIE ANDREW      [address] [place], [place] [id number]        Dear OLLIE,    Thank you for selecting [facility] for your healthcare needs.  Below you will find the results of your recent tests done at our clinic.     Labs are normal. Lyme screen is negative.      Result Name Current Result Reference Range   TSH (mIU/L)  2.77 8/19/2019 0.40 - 4.50   WBC  4.4 8/19/2019 3.8 - 10.8   Hgb (gm/dL)  14.8 8/19/2019 13.2 - 17.1   Platelet  163 8/19/2019 140 - 400   Lyme Ab IgG/IgM WB  <0.90 8/19/2019        Please contact me or my assistant at [phone] if you have any questions about your results.     Sincerely,        Anupam Licona MD        What do your labs mean?  Below is a glossary of commonly ordered labs:  LDL   Bad Cholesterol   HDL   Good Cholesterol  AST/ALT   Liver Function   Cr/Creatinine   Kidney Function  Microalbumin   Kidney Function  BUN   Kidney Function  PSA   Prostate    TSH   Thyroid Hormone  HgbA1c   Diabetes Test   Hgb (Hemoglobin)   Red Blood Cells

## 2022-02-16 NOTE — PROGRESS NOTES
Patient:   OLLIE ANDREW            MRN: [id number]            FIN: [id number]               Age:   58 years     Sex:  Male     :  1960   Associated Diagnoses:   Encounter for examination required by Department of Transportation (DOT); Moderate obstructive sleep apnea; Facial dermatitis   Author:   Blayne Esparza PA-C      Chief Complaint   2018 3:07 PM CDT    Pt here for a DOT Px.        Subjective   Chief complaint 2018 3:07 PM CDT    Pt here for a DOT Px.   DOT exam.     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 sleep apnea, wears CPAP has good compliance (90% use > 4hrs)      Health Status   Allergies:    Allergic Reactions (Selected)  Severity Not Documented  No known allergies (No reactions were documented)   Medications:  (Selected)   Prescriptions  Prescribed  CPAP 6 cm water pressure: CPAP 6 cm water pressure, See Instructions, Instructions: Use every night., Supply, # 1 EA, 0 Refill(s), Type: Maintenance   Problem list:    All Problems  Moderate obstructive sleep apnea / SNOMED CT [id number] / Confirmed  Canceled: CT Cardiac Calcium Score      Objective         Vital Signs   2018 3:07 PM CDT Temperature Tympanic 97.1 DegF  LOW    Peripheral Pulse Rate 52 bpm  LOW    Pulse Site Radial artery    Respiratory Rate 16 br/min      Measurements from flowsheet : Measurements   2018 3:07 PM CDT Height Measured - Standard 72.5 in    Weight Measured - Standard 203 lb    BSA 2.17 m2    Body Mass Index 27.15 kg/m2  HI      General:  No acute distress.    Eye:  Pupils are equal, round and reactive to light, Extraocular movements are intact, visual acuity is normal, horizontal field of vision is 90 degrees bilaterally.    HENT:  Tympanic membranes are clear, Normal hearing, No pharyngeal erythema, No sinus tenderness.    Neck:  Supple, Non-tender, No lymphadenopathy.    Respiratory:  Lungs are clear to auscultation.    Cardiovascular:  Normal rate, Regular rhythm, No murmur.     Gastrointestinal:  Soft, Non-tender, Non-distended, Normal bowel sounds, No organomegaly.    Genitourinary:  testicles smooth symmetrical, without nodules, no rashes or lesions, no hernia present.    Musculoskeletal:  Normal range of motion.    Neurologic:  Cranial Nerves II-XII are grossly intact, Normal deep tendon reflexes, Romberg is negative.    Psychiatric:  Appropriate mood & affect.       Results Review   Results review   Lab results   8/14/2018 3:02 PM CDT UA pH 7.0    UA Specific Gravity < OR = 1.005    UA Glucose NEGATIVE    UA Bilirubin NEGATIVE    UA Ketones NEGATIVE    U Occult Blood NEGATIVE    UA Protein NEGATIVE    UA Nitrite NEGATIVE    UA Leuk Est NEGATIVE         Impression and Plan   Assessment and Plan:          Diagnosis: Encounter for examination required by Department of Transportation (DOT) (HUG64-DD Z02.89), Moderate obstructive sleep apnea (AOG00-QL G47.33).    Orders     Orders   Charges:  9939D DOT Exam (Charge) (Order): Quantity: 1, Encounter for examination required by Department of Transportation (DOT)  Moderate obstructive sleep apnea.     Normal DOT exam; Approved for 1 year due to sleep apnea.     .    Assessment and Plan:          Diagnosis: Facial dermatitis (QSL40-TV L30.9).         Course: he has some acne-like lesions on his cheeks from where his CPAP mask straps come across  advised to try OTC benzoyl peroxide, will also try topical clindamycin qhs to see if that helps.    Orders     Orders   Pharmacy:  clindamycin 1% topical lotion (Prescribe): 1 kane, TOP, hs, # 60 mL, 1 Refill(s), Type: Maintenance, Pharmacy: [facility] [place],  kane Topical hs.     .

## 2022-09-20 ENCOUNTER — OFFICE VISIT (OUTPATIENT)
Dept: FAMILY MEDICINE | Facility: CLINIC | Age: 62
End: 2022-09-20
Payer: COMMERCIAL

## 2022-09-20 VITALS
WEIGHT: 199.5 LBS | DIASTOLIC BLOOD PRESSURE: 68 MMHG | HEART RATE: 52 BPM | OXYGEN SATURATION: 98 % | SYSTOLIC BLOOD PRESSURE: 104 MMHG | BODY MASS INDEX: 26.69 KG/M2

## 2022-09-20 DIAGNOSIS — Z13.1 SCREENING FOR DIABETES MELLITUS: ICD-10-CM

## 2022-09-20 DIAGNOSIS — Z00.00 ROUTINE GENERAL MEDICAL EXAMINATION AT A HEALTH CARE FACILITY: ICD-10-CM

## 2022-09-20 DIAGNOSIS — G47.33 OSA (OBSTRUCTIVE SLEEP APNEA): ICD-10-CM

## 2022-09-20 DIAGNOSIS — Z12.11 SCREEN FOR COLON CANCER: ICD-10-CM

## 2022-09-20 DIAGNOSIS — Z13.220 SCREENING FOR HYPERLIPIDEMIA: ICD-10-CM

## 2022-09-20 DIAGNOSIS — Z11.59 NEED FOR HEPATITIS C SCREENING TEST: ICD-10-CM

## 2022-09-20 DIAGNOSIS — Z12.5 SCREENING FOR PROSTATE CANCER: Primary | ICD-10-CM

## 2022-09-20 DIAGNOSIS — Z11.4 SCREENING FOR HIV (HUMAN IMMUNODEFICIENCY VIRUS): ICD-10-CM

## 2022-09-20 DIAGNOSIS — C61 PROSTATE CANCER (H): ICD-10-CM

## 2022-09-20 LAB
CHOLEST SERPL-MCNC: 163 MG/DL
HBA1C MFR BLD: 5.2 % (ref 0–5.6)
HDLC SERPL-MCNC: 53 MG/DL
LDLC SERPL CALC-MCNC: 102 MG/DL
NONHDLC SERPL-MCNC: 110 MG/DL
PSA SERPL-MCNC: 3.22 NG/ML (ref 0–4.5)
TRIGL SERPL-MCNC: 41 MG/DL

## 2022-09-20 PROCEDURE — 90471 IMMUNIZATION ADMIN: CPT | Performed by: FAMILY MEDICINE

## 2022-09-20 PROCEDURE — 80061 LIPID PANEL: CPT | Performed by: FAMILY MEDICINE

## 2022-09-20 PROCEDURE — G0103 PSA SCREENING: HCPCS | Performed by: FAMILY MEDICINE

## 2022-09-20 PROCEDURE — 99396 PREV VISIT EST AGE 40-64: CPT | Mod: 25 | Performed by: FAMILY MEDICINE

## 2022-09-20 PROCEDURE — 99213 OFFICE O/P EST LOW 20 MIN: CPT | Mod: 25 | Performed by: FAMILY MEDICINE

## 2022-09-20 PROCEDURE — 90715 TDAP VACCINE 7 YRS/> IM: CPT | Performed by: FAMILY MEDICINE

## 2022-09-20 PROCEDURE — 36415 COLL VENOUS BLD VENIPUNCTURE: CPT | Performed by: FAMILY MEDICINE

## 2022-09-20 PROCEDURE — 86803 HEPATITIS C AB TEST: CPT | Performed by: FAMILY MEDICINE

## 2022-09-20 PROCEDURE — 83036 HEMOGLOBIN GLYCOSYLATED A1C: CPT | Performed by: FAMILY MEDICINE

## 2022-09-20 NOTE — PROGRESS NOTES
Assessment & Plan   Problem List Items Addressed This Visit    None     Visit Diagnoses     Screening for prostate cancer    -  Primary    Screen for colon cancer        Relevant Orders    Colonoscopy Screening  Referral    Screening for HIV (human immunodeficiency virus)        Need for hepatitis C screening test        Relevant Orders    Hepatitis C Screen Reflex to HCV RNA Quant and Genotype    Screening for hyperlipidemia        Relevant Orders    Lipid panel reflex to direct LDL Non-fasting    Prostate cancer (H)        Relevant Orders    PSA, screen    Screening for diabetes mellitus        Relevant Orders    Hemoglobin A1c (Completed)    Routine general medical examination at a health care facility        KAVIN (obstructive sleep apnea)          Prostate cancer screening discussed with patient.  Using joint medical decision making patient will proceed with a PSA test.  He did have one about 3 years ago that was 2.4.  His brother was recently diagnosed with prostate cancer.      High risk of hepatitis C based on birthday we will get a hepatitis C test today.  Patient considers themselves low risk for HIV and declines this test.    Due for colon cancer screening we discussed options he would like to do a colonoscopy referral placed.    Patient is nonfasting so we will get a direct LDL for lipid screening and a hemoglobin A1c for diabetes screening    Obstructive sleep apnea suggested patient consider meeting with the sleep specialist to follow-up on his obstructive sleep apnea.  Did review chart and it looks like at his DOT physicals they are able to determine how often patient is compliant with using his CPAP however I am not sure if how effectively the CPAP is working is evaluated.  He could certainly reach out to his DOT provider for follow-up on this also.    Due for tetanus and shingles.  He would like to defer on the shingles for now but would like to get his tetanus updated.               Return  in about 1 year (around 9/20/2023) for Routine preventive.    Destiny Muhammad MD  [facility]    Milka Garza is a 62 year old, presenting for the following health issues:  PSA Recheck      History of Present Illness       Reason for visit:  Psa test    He eats 4 or more servings of fruits and vegetables daily.He consumes 0 sweetened beverage(s) daily.He exercises with enough effort to increase his heart rate 30 to 60 minutes per day.  He exercises with enough effort to increase his heart rate 4 days per week.   He is taking medications regularly.  Patient is here for an annual preventative visit.  His brother was recently diagnosed with prostate cancer and he would like to get a PSA test done.  He reports that he had 1 done at [facility] approximately 5 years ago.  Obtained consents to go into care everywhere and found his baseline PSA done in 2017 of 2.4.    Not currently on any medications    He has obstructive sleep apnea.  Its been years since he saw the sleep provider but reports that his chip is read annually with his DOT exams.  He uses his CPAP religiously and notices that he misses it if he does not use it.  He is able to get DME supplies ordered on a regular basis.          Review of Systems   Negative except as per HPI      Objective    /68 (BP Location: Right arm, Patient Position: Sitting)   Pulse 52   Wt 90.5 kg (199 lb 8 oz)   SpO2 98%   BMI 26.69 kg/m    Body mass index is 26.69 kg/m .  Physical Exam       General: alert and oriented ×3 no acute distress.    HEENT: Normocephalic and atraumatic.   Eyes pupils are equal round and reactive to light extraocular motion is intact. normal conjunctiva    Hearing is grossly normal and there is no otorrhea. Tympanic membranes are pearly grey with a normal light reflex.    Nares are patent there is no rhinorrhea.     Mucous membranes are moist and pink.    Chest: has bilateral rise with no increased work of  breathing. clear to auscultation without wheezes, rhonchi, or rales.    Cardiovascular: normal perfusion and brisk capillary refill. S1S2 with regular rate and rhythm and no murmurs, gallops or rubs.    Musculoskeletal: no gross focal abnormalities and normal gait.    Neuro: no gross focal abnormalities and memory seems intact. CN 2-12 are grossly intact.    Psychiatric: speech is clear and coherent and fluent. Patient dressed appropriately for the weather. Mood is appropriate and affect is full.       rectal declined

## 2022-09-21 LAB — HCV AB SERPL QL IA: NONREACTIVE

## 2022-10-03 ENCOUNTER — HEALTH MAINTENANCE LETTER (OUTPATIENT)
Age: 62
End: 2022-10-03

## 2022-12-09 ENCOUNTER — TRANSFERRED RECORDS (OUTPATIENT)
Dept: HEALTH INFORMATION MANAGEMENT | Facility: CLINIC | Age: 62
End: 2022-12-09

## 2023-08-05 ENCOUNTER — MYC MEDICAL ADVICE (OUTPATIENT)
Dept: FAMILY MEDICINE | Facility: CLINIC | Age: 63
End: 2023-08-05
Payer: COMMERCIAL

## 2023-08-21 ENCOUNTER — PATIENT OUTREACH (OUTPATIENT)
Dept: CARE COORDINATION | Facility: CLINIC | Age: 63
End: 2023-08-21
Payer: COMMERCIAL

## 2023-09-04 ENCOUNTER — PATIENT OUTREACH (OUTPATIENT)
Dept: CARE COORDINATION | Facility: CLINIC | Age: 63
End: 2023-09-04
Payer: COMMERCIAL

## 2023-10-22 ENCOUNTER — HEALTH MAINTENANCE LETTER (OUTPATIENT)
Age: 63
End: 2023-10-22

## 2024-04-02 ENCOUNTER — OFFICE VISIT (OUTPATIENT)
Dept: FAMILY MEDICINE | Facility: CLINIC | Age: 64
End: 2024-04-02
Payer: COMMERCIAL

## 2024-04-02 VITALS
RESPIRATION RATE: 16 BRPM | OXYGEN SATURATION: 94 % | HEART RATE: 56 BPM | WEIGHT: 204.3 LBS | BODY MASS INDEX: 28.6 KG/M2 | DIASTOLIC BLOOD PRESSURE: 60 MMHG | HEIGHT: 71 IN | TEMPERATURE: 97.8 F | SYSTOLIC BLOOD PRESSURE: 110 MMHG

## 2024-04-02 DIAGNOSIS — Z13.1 SCREENING FOR DIABETES MELLITUS: ICD-10-CM

## 2024-04-02 DIAGNOSIS — L98.9 SKIN LESION: ICD-10-CM

## 2024-04-02 DIAGNOSIS — Z12.5 SCREENING FOR PROSTATE CANCER: ICD-10-CM

## 2024-04-02 DIAGNOSIS — N52.8 OTHER MALE ERECTILE DYSFUNCTION: ICD-10-CM

## 2024-04-02 DIAGNOSIS — Z71.89 ACP (ADVANCE CARE PLANNING): ICD-10-CM

## 2024-04-02 DIAGNOSIS — Z00.00 ROUTINE GENERAL MEDICAL EXAMINATION AT A HEALTH CARE FACILITY: Primary | ICD-10-CM

## 2024-04-02 DIAGNOSIS — E78.5 DYSLIPIDEMIA: ICD-10-CM

## 2024-04-02 DIAGNOSIS — R39.15 URINARY URGENCY: ICD-10-CM

## 2024-04-02 PROBLEM — N48.6 PEYRONIE'S DISEASE: Status: ACTIVE | Noted: 2019-10-16

## 2024-04-02 PROBLEM — M19.019 SHOULDER ARTHRITIS: Status: ACTIVE | Noted: 2018-11-06

## 2024-04-02 LAB
ALBUMIN UR-MCNC: NEGATIVE MG/DL
APPEARANCE UR: CLEAR
BILIRUB UR QL STRIP: NEGATIVE
CHOLEST SERPL-MCNC: 189 MG/DL
COLOR UR AUTO: NORMAL
FASTING STATUS PATIENT QL REPORTED: NO
GLUCOSE UR STRIP-MCNC: NEGATIVE MG/DL
HBA1C MFR BLD: 5.2 % (ref 0–5.6)
HDLC SERPL-MCNC: 51 MG/DL
HGB UR QL STRIP: NEGATIVE
KETONES UR STRIP-MCNC: NEGATIVE MG/DL
LDLC SERPL CALC-MCNC: 127 MG/DL
LEUKOCYTE ESTERASE UR QL STRIP: NEGATIVE
NITRATE UR QL: NEGATIVE
NONHDLC SERPL-MCNC: 138 MG/DL
PH UR STRIP: 7 [PH] (ref 5–7)
PSA SERPL DL<=0.01 NG/ML-MCNC: 4.14 NG/ML (ref 0–4.5)
SP GR UR STRIP: 1.01 (ref 1–1.03)
TRIGL SERPL-MCNC: 55 MG/DL
UROBILINOGEN UR STRIP-ACNC: 0.2 E.U./DL

## 2024-04-02 PROCEDURE — G0103 PSA SCREENING: HCPCS | Performed by: FAMILY MEDICINE

## 2024-04-02 PROCEDURE — 81003 URINALYSIS AUTO W/O SCOPE: CPT | Mod: QW | Performed by: FAMILY MEDICINE

## 2024-04-02 PROCEDURE — 99213 OFFICE O/P EST LOW 20 MIN: CPT | Mod: 25 | Performed by: FAMILY MEDICINE

## 2024-04-02 PROCEDURE — 83036 HEMOGLOBIN GLYCOSYLATED A1C: CPT | Performed by: FAMILY MEDICINE

## 2024-04-02 PROCEDURE — 99396 PREV VISIT EST AGE 40-64: CPT | Performed by: FAMILY MEDICINE

## 2024-04-02 PROCEDURE — 80061 LIPID PANEL: CPT | Performed by: FAMILY MEDICINE

## 2024-04-02 PROCEDURE — 36415 COLL VENOUS BLD VENIPUNCTURE: CPT | Performed by: FAMILY MEDICINE

## 2024-04-02 RX ORDER — SILDENAFIL CITRATE 20 MG/1
40 TABLET ORAL DAILY
Qty: 30 TABLET | Refills: 5 | Status: SHIPPED | OUTPATIENT
Start: 2024-04-02

## 2024-04-02 SDOH — HEALTH STABILITY: PHYSICAL HEALTH: ON AVERAGE, HOW MANY MINUTES DO YOU ENGAGE IN EXERCISE AT THIS LEVEL?: 60 MIN

## 2024-04-02 SDOH — HEALTH STABILITY: PHYSICAL HEALTH: ON AVERAGE, HOW MANY DAYS PER WEEK DO YOU ENGAGE IN MODERATE TO STRENUOUS EXERCISE (LIKE A BRISK WALK)?: 5 DAYS

## 2024-04-02 ASSESSMENT — SOCIAL DETERMINANTS OF HEALTH (SDOH): HOW OFTEN DO YOU GET TOGETHER WITH FRIENDS OR RELATIVES?: PATIENT DECLINED

## 2024-04-02 NOTE — PATIENT INSTRUCTIONS
[facility] [facility]    Preventive Care Advice   This is general advice given by our system to help you stay healthy. However, your care team may have specific advice just for you. Please talk to your care team about your preventive care needs.  Nutrition  Eat 5 or more servings of fruits and vegetables each day.  Try wheat bread, brown rice and whole grain pasta (instead of white bread, rice, and pasta).  Get enough calcium and vitamin D. Check the label on foods and aim for 100% of the RDA (recommended daily allowance).  Lifestyle  Exercise at least 150 minutes each week   (30 minutes a day, 5 days a week).  Do muscle strengthening activities 2 days a week. These help control your weight and prevent disease.  No smoking.  Wear sunscreen to prevent skin cancer.  Have a dental exam and cleaning every 6 months.  Yearly exams  See your health care team every year to talk about:  Any changes in your health.  Any medicines your care team has prescribed.  Preventive care, family planning, and ways to prevent chronic diseases.  Shots (vaccines)   HPV shots (up to age 26), if you've never had them before.  Hepatitis B shots (up to age 59), if you've never had them before.  COVID-19 shot: Get this shot when it's due.  Flu shot: Get a flu shot every year.  Tetanus shot: Get a tetanus shot every 10 years.  Pneumococcal, hepatitis A, and RSV shots: Ask your care team if you need these based on your risk.  Shingles shot (for age 50 and up).  General health tests  Diabetes screening:  Starting at age 35, Get screened for diabetes at least every 3 years.  If you are younger than age 35, ask your care team if you should be screened for diabetes.  Cholesterol test: At age 39, start having a cholesterol test every 5 years, or more often if advised.  Bone density scan (DEXA): At age 50, ask your care team if you should have this scan for osteoporosis (brittle bones).  Hepatitis C: Get tested at least once in your life.  STIs (sexually  transmitted infections)  Before age 24: Ask your care team if you should be screened for STIs.  After age 24: Get screened for STIs if you're at risk. You are at risk for STIs (including HIV) if:  You are sexually active with more than one person.  You don't use condoms every time.  You or a partner was diagnosed with a sexually transmitted infection.  If you are at risk for HIV, ask about PrEP medicine to prevent HIV.  Get tested for HIV at least once in your life, whether you are at risk for HIV or not.  Cancer screening tests  Cervical cancer screening: If you have a cervix, begin getting regular cervical cancer screening tests at age 21. Most people who have regular screenings with normal results can stop after age 65. Talk about this with your provider.  Breast cancer scan (mammogram): If you've ever had breasts, begin having regular mammograms starting at age 40. This is a scan to check for breast cancer.  Colon cancer screening: It is important to start screening for colon cancer at age 45.  Have a colonoscopy test every 10 years (or more often if you're at risk) Or, ask your provider about stool tests like a FIT test every year or Cologuard test every 3 years.  To learn more about your testing options, visit: https://www.[employer]/658606.pdf.  For help making a decision, visit: https://bit.ly/bi48668.  Prostate cancer screening test: If you have a prostate and are age 55 to 69, ask your provider if you would benefit from a yearly prostate cancer screening test.  Lung cancer screening: If you are a current or former smoker age 50 to 80, ask your care team if ongoing lung cancer screenings are right for you.  For informational purposes only. Not to replace the advice of your health care provider. Copyright   2023 [employer]. All rights reserved. Clinically reviewed by the [facility]. [employer] 712347 - REV 01/24.

## 2024-04-02 NOTE — PROGRESS NOTES
Preventive Care Visit  [facility]  Destiny Muhammad MD, Family Medicine  Apr 2, 2024      Assessment & Plan   Problem List Items Addressed This Visit    None  Visit Diagnoses       Routine general medical examination at a health care facility    -  Primary    Screening for diabetes mellitus        Relevant Orders    Hemoglobin A1c (Completed)    Other male erectile dysfunction        Relevant Medications    sildenafil (REVATIO) 20 MG tablet    Screening for prostate cancer        Relevant Orders    PSA, screen    Urinary urgency        Relevant Orders    UA Macroscopic with reflex to Microscopic and Culture - Clinic Collect    Dyslipidemia        Relevant Orders    Lipid panel reflex to direct LDL Non-fasting    Skin lesion        ACP (advance care planning)             Nonfasting labs    Skin lesion right lower anterior leg same color surrounding skin, raised, approximately 3 x 4 mm oblong.  Patient reports that it has been itching.  Suggested patient come to clinic care for us to do a shave biopsy versus follow-up with his dermatologist.  Concern for possible basal cell carcinoma.    Dyslipidemia we are checking nonfasting labs today    Urinary urgency for about a week we will check a UA reflex to culture.  If this is normal suggest patient follow-up with his neurologist  Screening for prostate cancer to be PSA today    Peyronie's disease erectile dysfunction care everywhere reviewed.  Patient was evaluated by urology and has decided to not undergo any further treatment for his Peyronie's disease.  He is hoping that I can renew his sildenafil.  Usually 40 mg is effective for him.  This was renewed today.    Screening for diabetes we will get a hemoglobin A1c today.    Patient declines shingles vaccine today.  Advance care planning discussed today.                             BMI  Estimated body mass index is 28.49 kg/m  as calculated from the following:    Height as of this encounter:  "1.803 m (5' 11\").    Weight as of this encounter: 92.7 kg (204 lb 4.8 oz).   Weight management plan: Discussed healthy diet and exercise guidelines    Counseling  Appropriate preventive services were discussed with this patient, including applicable screening as appropriate for fall prevention, nutrition, physical activity, Tobacco-use cessation, weight loss and cognition.  Checklist reviewing preventive services available has been given to the patient.  Reviewed patient's diet, addressing concerns and/or questions.           Milka Garza is a 64 year old, presenting for the following:  Physical        4/2/2024    11:11 AM   Additional Questions   Roomed by [facility] Directive  Patient does not have a Health Care Directive or Living Will: Discussed advance care planning with patient; information given to patient to review.    HPI  Pt here for wellness visit.              4/2/2024   General Health   How would you rate your overall physical health? Good   Feel stress (tense, anxious, or unable to sleep) Not at all         4/2/2024   Nutrition   Three or more servings of calcium each day? Yes   Diet: I don't know   How many servings of fruit and vegetables per day? (!) 2-3   How many sweetened beverages each day? 0-1         4/2/2024   Exercise   Days per week of moderate/strenous exercise 5 days   Average minutes spent exercising at this level 60 min         4/2/2024   Social Factors   Frequency of gathering with friends or relatives Patient declined   Worry food won't last until get money to buy more No   Food not last or not have enough money for food? No   Do you have housing?  Yes   Are you worried about losing your housing? No   Lack of transportation? No   Unable to get utilities (heat,electricity)? No          No data to display                   4/2/2024   Dental   Dentist two times every year? Yes         4/2/2024   TB Screening   Were you born outside of the [place]? No         Today's " "PHQ-2 Score:       4/2/2024    11:10 AM   PHQ-2 ( 1999 [employer])   Q1: Little interest or pleasure in doing things 0   Q2: Feeling down, depressed or hopeless 0   PHQ-2 Score 0   Q1: Little interest or pleasure in doing things Not at all   Q2: Feeling down, depressed or hopeless Not at all   PHQ-2 Score 0           4/2/2024   Substance Use   Alcohol more than 3/day or more than 7/wk No   Do you use any other substances recreationally? No     Social History     Tobacco Use    Smoking status: Never     Passive exposure: Never    Smokeless tobacco: Never   Vaping Use    Vaping Use: Never used           4/2/2024   STI Screening   New sexual partner(s) since last STI/HIV test? No   Last PSA:   Prostate Specific Antigen Screen   Date Value Ref Range Status   09/20/2022 3.22 0.00 - 4.50 ng/mL Final     ASCVD Risk   The 10-year ASCVD risk score (Rory CABRERA, et al., 2019) is: 6.4%    Values used to calculate the score:      Age: 64 years      Sex: Male      Is Non- : No      Diabetic: No      Tobacco smoker: No      Systolic Blood Pressure: 110 mmHg      Is BP treated: No      HDL Cholesterol: 53 mg/dL      Total Cholesterol: 163 mg/dL           Reviewed and updated as needed this visit by Provider                             Objective    Exam  /60 (BP Location: Right arm, Patient Position: Sitting)   Pulse 56   Temp 97.8  F (36.6  C) (Tympanic)   Resp 16   Ht 1.803 m (5' 11\")   Wt 92.7 kg (204 lb 4.8 oz)   SpO2 94%   BMI 28.49 kg/m     Estimated body mass index is 28.49 kg/m  as calculated from the following:    Height as of this encounter: 1.803 m (5' 11\").    Weight as of this encounter: 92.7 kg (204 lb 4.8 oz).    Physical Exam        General: alert and oriented ×3 no acute distress.    HEENT: pupils are equal round and reactive to light extraocular motion is intact. Normocephalic and atraumatic.     Hearing is grossly normal and there is no otorrhea.     Chest: has bilateral " rise with no increased work of breathing.    Cardiovascular: normal perfusion and brisk capillary refill.    Musculoskeletal: no gross focal abnormalities and normal gait.    Neuro: no gross focal abnormalities and memory seems intact.    Psychiatric: speech is clear and coherent and fluent. Patient dressed appropriately for the weather. Mood is appropriate and affect is full.    right lower anterior leg same color surrounding skin, raised, approximately 3 x 4 mm oblong.              Signed Electronically by: Destiny Muhammad MD

## 2024-04-04 ENCOUNTER — TELEPHONE (OUTPATIENT)
Dept: FAMILY MEDICINE | Facility: CLINIC | Age: 64
End: 2024-04-04
Payer: COMMERCIAL

## 2024-04-04 DIAGNOSIS — R39.15 URINARY URGENCY: Primary | ICD-10-CM

## 2024-04-04 DIAGNOSIS — N48.6 PEYRONIE'S DISEASE: ICD-10-CM

## 2024-04-04 NOTE — TELEPHONE ENCOUNTER
General Call    Contacts         Type Contact Phone/Fax    04/04/2024 12:38 PM CDT Phone (Incoming) Greg Christian (Self) [phone] (H)          Reason for Call:  Patient would like a recommendation for a urologist    Could we send this information to you in [facility] or would you prefer to receive a phone call?:   No preference   Okay to leave a detailed message?: Yes at Cell number on file:    Telephone Information:   Mobile [phone]

## 2024-05-24 ENCOUNTER — OFFICE VISIT (OUTPATIENT)
Dept: FAMILY MEDICINE | Facility: CLINIC | Age: 64
End: 2024-05-24
Payer: COMMERCIAL

## 2024-05-24 VITALS
OXYGEN SATURATION: 97 % | BODY MASS INDEX: 27.59 KG/M2 | SYSTOLIC BLOOD PRESSURE: 112 MMHG | RESPIRATION RATE: 18 BRPM | HEIGHT: 71 IN | TEMPERATURE: 97.8 F | HEART RATE: 53 BPM | DIASTOLIC BLOOD PRESSURE: 68 MMHG | WEIGHT: 197.1 LBS

## 2024-05-24 DIAGNOSIS — J36 TONSIL, ABSCESS: ICD-10-CM

## 2024-05-24 DIAGNOSIS — J02.9 SORE THROAT: Primary | ICD-10-CM

## 2024-05-24 LAB
DEPRECATED S PYO AG THROAT QL EIA: NEGATIVE
GROUP A STREP BY PCR: NOT DETECTED

## 2024-05-24 PROCEDURE — 99213 OFFICE O/P EST LOW 20 MIN: CPT

## 2024-05-24 PROCEDURE — 87651 STREP A DNA AMP PROBE: CPT

## 2024-05-24 ASSESSMENT — ENCOUNTER SYMPTOMS: SORE THROAT: 1

## 2024-05-24 NOTE — PROGRESS NOTES
"  Assessment & Plan     Sore throat  Patient with sore throat and mild throat swelling on the right side since Monday.  Patient feels symptoms are worsening he did have some chills approximately 2 days ago.  - Streptococcus A Rapid Screen w/Reflex to PCR - Clinic Collect  - Group A Streptococcus PCR Throat Swab    Tonsil, abscess  1 to 2 mm area of purulence on right tonsil.  Swelling to right side of neck and tenderness to palpation of area.  Unclear if this is developing abscess or just exudate.  With long holiday weekend and danger of tonsillar abscess will start on Augmentin.  Discussed red flags with patient and when to seek immediate medical care including shortness of breath, difficulty breathing, inability to handle secretions, difficulty swallowing.  Patient verbalizes understanding.  - amoxicillin-clavulanate (AUGMENTIN) 875-125 MG tablet; Take 1 tablet by mouth 2 times daily for 7 days                Subjective   Greg is a 64 year old, presenting for the following health issues:  Pharyngitis (Swollen gland right side, since Monday  )      5/24/2024    11:14 AM   Additional Questions   Roomed by BRITTANY Monge     History of Present Illness       Reason for visit:  Sore throat  Symptom onset:  3-7 days ago  Symptom intensity:  Moderate  Symptom progression:  Staying the same  Had these symptoms before:  No    He eats 2-3 servings of fruits and vegetables daily.He consumes 0 sweetened beverage(s) daily.He exercises with enough effort to increase his heart rate 60 or more minutes per day.    He is taking medications regularly.                     Objective    /68 (BP Location: Right arm, Patient Position: Sitting, Cuff Size: Adult Large)   Pulse 53   Temp 97.8  F (36.6  C) (Tympanic)   Resp 18   Ht 1.803 m (5' 11\")   Wt 89.4 kg (197 lb 1.6 oz)   SpO2 97%   BMI 27.49 kg/m    Body mass index is 27.49 kg/m .  Physical Exam  HENT:      Head: Normocephalic.      Mouth/Throat:      Mouth: Mucous " membranes are moist.      Pharynx: Uvula midline. Posterior oropharyngeal erythema present. No pharyngeal swelling or uvula swelling.      Tonsils: Tonsillar abscess present. 2+ on the right. 1+ on the left.     Eyes:      Extraocular Movements: Extraocular movements intact.      Conjunctiva/sclera: Conjunctivae normal.   Cardiovascular:      Rate and Rhythm: Normal rate.   Pulmonary:      Effort: Pulmonary effort is normal.   Skin:     General: Skin is warm and dry.      Capillary Refill: Capillary refill takes less than 2 seconds.   Neurological:      Mental Status: He is alert and oriented to person, place, and time.                    Signed Electronically by: OVIDIO Mack CNP

## 2024-11-14 ENCOUNTER — TRANSFERRED RECORDS (OUTPATIENT)
Dept: HEALTH INFORMATION MANAGEMENT | Facility: CLINIC | Age: 64
End: 2024-11-14
Payer: COMMERCIAL

## 2025-04-03 ENCOUNTER — LAB (OUTPATIENT)
Dept: LAB | Facility: CLINIC | Age: 65
End: 2025-04-03

## 2025-04-03 DIAGNOSIS — M25.521 RIGHT ELBOW PAIN: ICD-10-CM

## 2025-04-03 DIAGNOSIS — R53.83 FATIGUE: ICD-10-CM

## 2025-04-03 DIAGNOSIS — N52.9 IMPOTENCE OF ORGANIC ORIGIN: ICD-10-CM

## 2025-04-03 DIAGNOSIS — K58.2 IRRITABLE BOWEL SYNDROME WITH CONSTIPATION AND DIARRHEA: ICD-10-CM

## 2025-04-03 DIAGNOSIS — Z85.46 PERSONAL HISTORY OF MALIGNANT NEOPLASM OF PROSTATE: ICD-10-CM

## 2025-04-03 DIAGNOSIS — H40.1191 PRIMARY OPEN-ANGLE GLAUCOMA, MILD STAGE: ICD-10-CM

## 2025-04-03 DIAGNOSIS — M79.10 MYALGIA: ICD-10-CM

## 2025-04-03 LAB
ALBUMIN SERPL BCG-MCNC: 4.6 G/DL (ref 3.5–5.2)
ALP SERPL-CCNC: 65 U/L (ref 40–150)
ALT SERPL W P-5'-P-CCNC: 24 U/L (ref 0–70)
ANION GAP SERPL CALCULATED.3IONS-SCNC: 11 MMOL/L (ref 7–15)
AST SERPL W P-5'-P-CCNC: 26 U/L (ref 0–45)
BASOPHILS # BLD AUTO: 0 10E3/UL (ref 0–0.2)
BASOPHILS NFR BLD AUTO: 1 %
BILIRUB SERPL-MCNC: 0.7 MG/DL
BUN SERPL-MCNC: 19.1 MG/DL (ref 8–23)
CALCIUM SERPL-MCNC: 10.1 MG/DL (ref 8.8–10.4)
CHLORIDE SERPL-SCNC: 101 MMOL/L (ref 98–107)
CHOLEST SERPL-MCNC: 179 MG/DL
CREAT SERPL-MCNC: 1 MG/DL (ref 0.67–1.17)
CRP SERPL-MCNC: <3 MG/L
EGFRCR SERPLBLD CKD-EPI 2021: 85 ML/MIN/1.73M2
EOSINOPHIL # BLD AUTO: 0.2 10E3/UL (ref 0–0.7)
EOSINOPHIL NFR BLD AUTO: 4 %
ERYTHROCYTE [DISTWIDTH] IN BLOOD BY AUTOMATED COUNT: 12.7 % (ref 10–15)
EST. AVERAGE GLUCOSE BLD GHB EST-MCNC: 105 MG/DL
FASTING STATUS PATIENT QL REPORTED: ABNORMAL
FASTING STATUS PATIENT QL REPORTED: NORMAL
FERRITIN SERPL-MCNC: 284 NG/ML (ref 31–409)
GLUCOSE SERPL-MCNC: 99 MG/DL (ref 70–99)
HBA1C MFR BLD: 5.3 % (ref 0–5.6)
HCO3 SERPL-SCNC: 27 MMOL/L (ref 22–29)
HCT VFR BLD AUTO: 47.7 % (ref 40–53)
HCYS SERPL-SCNC: 9.8 UMOL/L (ref 0–15)
HDLC SERPL-MCNC: 52 MG/DL
HGB BLD-MCNC: 16.2 G/DL (ref 13.3–17.7)
IMM GRANULOCYTES # BLD: 0 10E3/UL
IMM GRANULOCYTES NFR BLD: 0 %
INSULIN SERPL-ACNC: 7.7 UU/ML (ref 2.6–24.9)
LDLC SERPL CALC-MCNC: 110 MG/DL
LYMPHOCYTES # BLD AUTO: 1.4 10E3/UL (ref 0.8–5.3)
LYMPHOCYTES NFR BLD AUTO: 39 %
MCH RBC QN AUTO: 30.4 PG (ref 26.5–33)
MCHC RBC AUTO-ENTMCNC: 34 G/DL (ref 31.5–36.5)
MCV RBC AUTO: 90 FL (ref 78–100)
MONOCYTES # BLD AUTO: 0.3 10E3/UL (ref 0–1.3)
MONOCYTES NFR BLD AUTO: 7 %
NEUTROPHILS # BLD AUTO: 1.8 10E3/UL (ref 1.6–8.3)
NEUTROPHILS NFR BLD AUTO: 49 %
NONHDLC SERPL-MCNC: 127 MG/DL
PLATELET # BLD AUTO: 186 10E3/UL (ref 150–450)
POTASSIUM SERPL-SCNC: 4.4 MMOL/L (ref 3.4–5.3)
PROGEST SERPL-MCNC: 0.3 NG/ML (ref 0–0.2)
PROT SERPL-MCNC: 7.8 G/DL (ref 6.4–8.3)
PSA SERPL DL<=0.01 NG/ML-MCNC: 6.72 NG/ML (ref 0–4.5)
RBC # BLD AUTO: 5.33 10E6/UL (ref 4.4–5.9)
SHBG SERPL-SCNC: 44 NMOL/L (ref 11–80)
SODIUM SERPL-SCNC: 139 MMOL/L (ref 135–145)
T3FREE SERPL-MCNC: 2.8 PG/ML (ref 2–4.4)
T4 FREE SERPL-MCNC: 1.18 NG/DL (ref 0.9–1.7)
TRIGL SERPL-MCNC: 83 MG/DL
TSH SERPL DL<=0.005 MIU/L-ACNC: 4.6 UIU/ML (ref 0.3–4.2)
VIT D+METAB SERPL-MCNC: 51 NG/ML (ref 20–50)
WBC # BLD AUTO: 3.7 10E3/UL (ref 4–11)

## 2025-04-05 LAB — IODINE SERPL-MCNC: 55.8 UG/L

## 2025-04-06 LAB
ANDROSTANOLONE SERPL-MCNC: 617.3 PG/ML
T3REVERSE SERPL-MCNC: 13.7 NG/DL

## 2025-04-07 LAB — LEPTIN SERPL-MCNC: 1.7 NG/ML

## 2025-04-08 LAB
ESTRADIOL SERPL HS-MCNC: 28 PG/ML (ref 10–40)
TESTOST FREE SERPL-MCNC: 10.57 NG/DL
TESTOST SERPL-MCNC: 588 NG/DL (ref 240–950)

## 2025-05-18 ENCOUNTER — HEALTH MAINTENANCE LETTER (OUTPATIENT)
Age: 65
End: 2025-05-18

## 2025-06-03 ENCOUNTER — TRANSFERRED RECORDS (OUTPATIENT)
Dept: HEALTH INFORMATION MANAGEMENT | Facility: CLINIC | Age: 65
End: 2025-06-03
Payer: COMMERCIAL